# Patient Record
Sex: FEMALE | Race: BLACK OR AFRICAN AMERICAN | URBAN - METROPOLITAN AREA
[De-identification: names, ages, dates, MRNs, and addresses within clinical notes are randomized per-mention and may not be internally consistent; named-entity substitution may affect disease eponyms.]

---
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
## 2019-03-02 ENCOUNTER — TELEPHONE (OUTPATIENT)
Dept: SURGERY | Age: 19
End: 2019-03-02

## 2019-03-02 NOTE — TELEPHONE ENCOUNTER
Called patient no longer interested in program last seen 11/18.  She will call us if she changes her mind